# Patient Record
Sex: MALE | Race: WHITE | NOT HISPANIC OR LATINO | Employment: OTHER | URBAN - METROPOLITAN AREA
[De-identification: names, ages, dates, MRNs, and addresses within clinical notes are randomized per-mention and may not be internally consistent; named-entity substitution may affect disease eponyms.]

---

## 2023-12-08 ENCOUNTER — APPOINTMENT (OUTPATIENT)
Dept: RADIOLOGY | Facility: HOSPITAL | Age: 68
End: 2023-12-08
Payer: MEDICARE

## 2023-12-08 ENCOUNTER — HOSPITAL ENCOUNTER (EMERGENCY)
Facility: HOSPITAL | Age: 68
Discharge: HOME | End: 2023-12-08
Attending: EMERGENCY MEDICINE
Payer: MEDICARE

## 2023-12-08 VITALS
HEART RATE: 94 BPM | SYSTOLIC BLOOD PRESSURE: 119 MMHG | BODY MASS INDEX: 37.67 KG/M2 | RESPIRATION RATE: 20 BRPM | TEMPERATURE: 97.7 F | HEIGHT: 67 IN | OXYGEN SATURATION: 93 % | DIASTOLIC BLOOD PRESSURE: 73 MMHG | WEIGHT: 240 LBS

## 2023-12-08 DIAGNOSIS — H81.10 BENIGN PAROXYSMAL POSITIONAL VERTIGO, UNSPECIFIED LATERALITY: ICD-10-CM

## 2023-12-08 DIAGNOSIS — R42 VERTIGO: Primary | ICD-10-CM

## 2023-12-08 LAB
ALBUMIN SERPL BCP-MCNC: 4.6 G/DL (ref 3.4–5)
ALP SERPL-CCNC: 60 U/L (ref 33–136)
ALT SERPL W P-5'-P-CCNC: 30 U/L (ref 10–52)
ANION GAP SERPL CALC-SCNC: 15 MMOL/L (ref 10–20)
AST SERPL W P-5'-P-CCNC: 27 U/L (ref 9–39)
BASOPHILS # BLD AUTO: 0.04 X10*3/UL (ref 0–0.1)
BASOPHILS NFR BLD AUTO: 0.4 %
BILIRUB SERPL-MCNC: 0.4 MG/DL (ref 0–1.2)
BUN SERPL-MCNC: 17 MG/DL (ref 6–23)
CALCIUM SERPL-MCNC: 9.5 MG/DL (ref 8.6–10.3)
CARDIAC TROPONIN I PNL SERPL HS: 3 NG/L (ref 0–20)
CARDIAC TROPONIN I PNL SERPL HS: <3 NG/L (ref 0–20)
CARDIAC TROPONIN I PNL SERPL HS: <3 NG/L (ref 0–20)
CHLORIDE SERPL-SCNC: 101 MMOL/L (ref 98–107)
CO2 SERPL-SCNC: 23 MMOL/L (ref 21–32)
CREAT SERPL-MCNC: 0.87 MG/DL (ref 0.5–1.3)
EOSINOPHIL # BLD AUTO: 0.28 X10*3/UL (ref 0–0.7)
EOSINOPHIL NFR BLD AUTO: 2.5 %
ERYTHROCYTE [DISTWIDTH] IN BLOOD BY AUTOMATED COUNT: 12.3 % (ref 11.5–14.5)
GFR SERPL CREATININE-BSD FRML MDRD: >90 ML/MIN/1.73M*2
GLUCOSE SERPL-MCNC: 180 MG/DL (ref 74–99)
HCT VFR BLD AUTO: 40 % (ref 41–52)
HGB BLD-MCNC: 13.3 G/DL (ref 13.5–17.5)
IMM GRANULOCYTES # BLD AUTO: 0.05 X10*3/UL (ref 0–0.7)
IMM GRANULOCYTES NFR BLD AUTO: 0.4 % (ref 0–0.9)
LYMPHOCYTES # BLD AUTO: 1.71 X10*3/UL (ref 1.2–4.8)
LYMPHOCYTES NFR BLD AUTO: 15.2 %
MCH RBC QN AUTO: 30.2 PG (ref 26–34)
MCHC RBC AUTO-ENTMCNC: 33.3 G/DL (ref 32–36)
MCV RBC AUTO: 91 FL (ref 80–100)
MONOCYTES # BLD AUTO: 0.68 X10*3/UL (ref 0.1–1)
MONOCYTES NFR BLD AUTO: 6 %
NEUTROPHILS # BLD AUTO: 8.48 X10*3/UL (ref 1.2–7.7)
NEUTROPHILS NFR BLD AUTO: 75.5 %
NRBC BLD-RTO: 0 /100 WBCS (ref 0–0)
PLATELET # BLD AUTO: 252 X10*3/UL (ref 150–450)
POTASSIUM SERPL-SCNC: 3.5 MMOL/L (ref 3.5–5.3)
PROT SERPL-MCNC: 7.6 G/DL (ref 6.4–8.2)
RBC # BLD AUTO: 4.4 X10*6/UL (ref 4.5–5.9)
SODIUM SERPL-SCNC: 135 MMOL/L (ref 136–145)
WBC # BLD AUTO: 11.2 X10*3/UL (ref 4.4–11.3)

## 2023-12-08 PROCEDURE — 80053 COMPREHEN METABOLIC PANEL: CPT | Performed by: STUDENT IN AN ORGANIZED HEALTH CARE EDUCATION/TRAINING PROGRAM

## 2023-12-08 PROCEDURE — 84484 ASSAY OF TROPONIN QUANT: CPT | Performed by: STUDENT IN AN ORGANIZED HEALTH CARE EDUCATION/TRAINING PROGRAM

## 2023-12-08 PROCEDURE — 84484 ASSAY OF TROPONIN QUANT: CPT | Performed by: EMERGENCY MEDICINE

## 2023-12-08 PROCEDURE — 99285 EMERGENCY DEPT VISIT HI MDM: CPT | Mod: 25

## 2023-12-08 PROCEDURE — 36415 COLL VENOUS BLD VENIPUNCTURE: CPT

## 2023-12-08 PROCEDURE — 99284 EMERGENCY DEPT VISIT MOD MDM: CPT | Performed by: EMERGENCY MEDICINE

## 2023-12-08 PROCEDURE — 36415 COLL VENOUS BLD VENIPUNCTURE: CPT | Performed by: STUDENT IN AN ORGANIZED HEALTH CARE EDUCATION/TRAINING PROGRAM

## 2023-12-08 PROCEDURE — 85025 COMPLETE CBC W/AUTO DIFF WBC: CPT | Performed by: EMERGENCY MEDICINE

## 2023-12-08 PROCEDURE — 71045 X-RAY EXAM CHEST 1 VIEW: CPT | Mod: FOREIGN READ | Performed by: RADIOLOGY

## 2023-12-08 PROCEDURE — 80053 COMPREHEN METABOLIC PANEL: CPT | Performed by: EMERGENCY MEDICINE

## 2023-12-08 PROCEDURE — 85025 COMPLETE CBC W/AUTO DIFF WBC: CPT | Performed by: STUDENT IN AN ORGANIZED HEALTH CARE EDUCATION/TRAINING PROGRAM

## 2023-12-08 PROCEDURE — 84484 ASSAY OF TROPONIN QUANT: CPT

## 2023-12-08 PROCEDURE — 71045 X-RAY EXAM CHEST 1 VIEW: CPT | Mod: FR

## 2023-12-08 PROCEDURE — 70450 CT HEAD/BRAIN W/O DYE: CPT | Performed by: STUDENT IN AN ORGANIZED HEALTH CARE EDUCATION/TRAINING PROGRAM

## 2023-12-08 PROCEDURE — 70450 CT HEAD/BRAIN W/O DYE: CPT

## 2023-12-08 RX ORDER — TADALAFIL 5 MG/1
5 TABLET ORAL
COMMUNITY
Start: 2019-11-04

## 2023-12-08 RX ORDER — FLUTICASONE PROPIONATE 50 MCG
SPRAY, SUSPENSION (ML) NASAL
COMMUNITY
Start: 2019-11-04

## 2023-12-08 RX ORDER — VIT C/E/ZN/COPPR/LUTEIN/ZEAXAN 250MG-90MG
1000 CAPSULE ORAL
COMMUNITY
Start: 2023-02-07

## 2023-12-08 RX ORDER — LISINOPRIL AND HYDROCHLOROTHIAZIDE 20; 25 MG/1; MG/1
TABLET ORAL
COMMUNITY
Start: 2023-02-08

## 2023-12-08 RX ORDER — LIDOCAINE HYDROCHLORIDE 20 MG/ML
5 SOLUTION OROPHARYNGEAL
COMMUNITY
Start: 2023-11-26

## 2023-12-08 RX ORDER — GLUCOSAMINE/CHONDR SU A SOD 167-133 MG
2 CAPSULE ORAL NIGHTLY
COMMUNITY
Start: 2011-06-09

## 2023-12-08 RX ORDER — METFORMIN HYDROCHLORIDE 500 MG/1
4 TABLET, EXTENDED RELEASE ORAL
COMMUNITY
Start: 2023-03-22

## 2023-12-08 RX ORDER — ROSUVASTATIN CALCIUM 40 MG/1
TABLET, COATED ORAL
COMMUNITY
Start: 2023-11-03

## 2023-12-08 RX ORDER — ASPIRIN 81 MG/1
TABLET ORAL
COMMUNITY
Start: 2023-02-07

## 2023-12-08 RX ORDER — IPRATROPIUM BROMIDE 42 UG/1
SPRAY, METERED NASAL
COMMUNITY
Start: 2022-03-03

## 2023-12-08 RX ORDER — ALBUTEROL SULFATE 90 UG/1
2 AEROSOL, METERED RESPIRATORY (INHALATION) EVERY 4 HOURS PRN
COMMUNITY
Start: 2023-08-08

## 2023-12-08 RX ORDER — MECLIZINE HYDROCHLORIDE 25 MG/1
25 TABLET ORAL 3 TIMES DAILY PRN
Qty: 10 TABLET | Refills: 0 | Status: SHIPPED | OUTPATIENT
Start: 2023-12-08 | End: 2023-12-18

## 2023-12-08 RX ORDER — TOPIRAMATE 50 MG/1
TABLET, FILM COATED ORAL
COMMUNITY
Start: 2023-08-08

## 2023-12-08 ASSESSMENT — LIFESTYLE VARIABLES
HAVE YOU EVER FELT YOU SHOULD CUT DOWN ON YOUR DRINKING: NO
EVER HAD A DRINK FIRST THING IN THE MORNING TO STEADY YOUR NERVES TO GET RID OF A HANGOVER: NO
EVER FELT BAD OR GUILTY ABOUT YOUR DRINKING: NO
HAVE PEOPLE ANNOYED YOU BY CRITICIZING YOUR DRINKING: NO
REASON UNABLE TO ASSESS: NO

## 2023-12-08 ASSESSMENT — COLUMBIA-SUICIDE SEVERITY RATING SCALE - C-SSRS
2. HAVE YOU ACTUALLY HAD ANY THOUGHTS OF KILLING YOURSELF?: NO
1. IN THE PAST MONTH, HAVE YOU WISHED YOU WERE DEAD OR WISHED YOU COULD GO TO SLEEP AND NOT WAKE UP?: NO
6. HAVE YOU EVER DONE ANYTHING, STARTED TO DO ANYTHING, OR PREPARED TO DO ANYTHING TO END YOUR LIFE?: NO

## 2023-12-08 ASSESSMENT — PAIN DESCRIPTION - PAIN TYPE: TYPE: ACUTE PAIN

## 2023-12-08 ASSESSMENT — PAIN - FUNCTIONAL ASSESSMENT: PAIN_FUNCTIONAL_ASSESSMENT: 0-10

## 2023-12-08 ASSESSMENT — PAIN SCALES - GENERAL: PAINLEVEL_OUTOF10: 3

## 2023-12-08 ASSESSMENT — PAIN DESCRIPTION - LOCATION: LOCATION: EAR

## 2023-12-08 NOTE — ED TRIAGE NOTES
Pt reports that he was on amoxicillin for dental infection. Had tooth pulled Wednesday 12/6/23.  Pt reports nausea/ earache since extraction

## 2023-12-08 NOTE — Clinical Note
Troy Sr was seen and treated in our emergency department on 12/8/2023.  He may return to school on 12/13/2023.      If you have any questions or concerns, please don't hesitate to call.      Marlon Michelle MD

## 2023-12-09 NOTE — ED PROVIDER NOTES
EMERGENCY DEPARTMENT ENCOUNTER      Pt Name: Troy Sr  MRN: [id number]  Birthdate 1955  Date of evaluation: 12/8/2023  Provider: Marlon Michelle MD    CHIEF COMPLAINT       Chief Complaint   Patient presents with    Nausea         HISTORY OF PRESENT ILLNESS    HPI  68-year-old male presents emergency room with chief complaint of nausea and vertigo.  Patient endorses pain 2 weeks ago on the left side of his face.  He went to the doctor and they realize that he had a tooth abscess.  They placed him on amoxicillin for 10 days which he took all of the prescribed medication he had a tooth pulled 2 days ago same time developed a head cold with mucus he took Sudafed last night little this morning and took a 12-hour Mucinex around 3:00 this afternoon he began to feel unbalanced nauseous and was concerned so he came to the emergency room.  Nursing Notes were reviewed.    PAST MEDICAL HISTORY     Past Medical History:   Diagnosis Date    Diabetes mellitus (CMS/[facility])     Hypertension          SURGICAL HISTORY       Past Surgical History:   Procedure Laterality Date    DENTAL SURGERY Left 12/06/2023    left lower         CURRENT MEDICATIONS       Previous Medications    ALBUTEROL 90 MCG/ACTUATION INHALER    Inhale 2 puffs every 4 hours if needed.    ASPIRIN 81 MG EC TABLET    Take by mouth.    CHOLECALCIFEROL (VITAMIN D-3) 25 MCG (1000 UT) CAPSULE    Take 1 capsule (25 mcg) by mouth once daily.    FLUTICASONE (FLONASE ALLERGY RELIEF) 50 MCG/ACTUATION NASAL SPRAY    Administer into affected nostril(s).    IPRATROPIUM (ATROVENT) 42 MCG (0.06 %) NASAL SPRAY    ADMINISTER 2 (TWO) PUFFS THREE TIMES DAILY IN EACH NOSTRIL AS NEEDED    LIDOCAINE (XYLOCAINE) 2 % SOLUTION    Take 5 mL by mouth.    LISINOPRIL-HYDROCHLOROTHIAZIDE 20-25 MG TABLET        METFORMIN  MG 24 HR TABLET    Take 4 tablets (2,000 mg) by mouth once daily with a meal.    NIACIN 500 MG TABLET    Take 2 tablets (1,000 mg) by mouth once daily at bedtime.     ROSUVASTATIN (CRESTOR) 40 MG TABLET    Take by mouth.    TADALAFIL (CIALIS) 5 MG TABLET    Take 1 tablet (5 mg) by mouth.    TOPIRAMATE (TOPAMAX) 50 MG TABLET    Take by mouth.       ALLERGIES     Patient has no known allergies.    FAMILY HISTORY     No family history on file.       SOCIAL HISTORY       Social History     Socioeconomic History    Marital status: Unknown     Spouse name: None    Number of children: None    Years of education: None    Highest education level: None   Occupational History    None   Tobacco Use    Smoking status: Never    Smokeless tobacco: Never   Substance and Sexual Activity    Alcohol use: Yes     Comment: social    Drug use: Never    Sexual activity: None   Other Topics Concern    None   Social History Narrative    None     Social Determinants of Health     Financial Resource Strain: Not on file   Food Insecurity: Not on file   Transportation Needs: Not on file   Physical Activity: Not on file   Stress: Not on file   Social Connections: Not on file   Intimate Partner Violence: Not on file   Housing Stability: Not on file       SCREENINGS                        PHYSICAL EXAM    (up to 7 for level 4, 8 or more for level 5)     ED Triage Vitals [12/08/23 1732]   Temp Pulse Resp BP   36.5 °C (97.7 °F) -- 16 158/88      SpO2 Temp Source Heart Rate Source Patient Position   95 % Temporal Monitor Sitting      BP Location FiO2 (%)     Right arm --       Physical Exam  Vitals and nursing note reviewed.   Constitutional:       General: He is not in acute distress.     Appearance: He is well-developed.   HENT:      Head: Normocephalic and atraumatic.   Eyes:      Conjunctiva/sclera: Conjunctivae normal.   Cardiovascular:      Rate and Rhythm: Normal rate and regular rhythm.      Heart sounds: No murmur heard.  Pulmonary:      Effort: Pulmonary effort is normal. No respiratory distress.      Breath sounds: Normal breath sounds.   Abdominal:      Palpations: Abdomen is soft.      Tenderness:  There is no abdominal tenderness.   Musculoskeletal:         General: No swelling.      Cervical back: Neck supple.   Skin:     General: Skin is warm and dry.      Capillary Refill: Capillary refill takes less than 2 seconds.   Neurological:      Mental Status: He is alert and oriented to person, place, and time. Mental status is at baseline.      Cranial Nerves: Cranial nerves 2-12 are intact.      Sensory: Sensation is intact.      Motor: Motor function is intact.      Coordination: Coordination is intact.      Gait: Gait is intact.      Comments: Patient is neurologically intact NIH of 0.  Patient is able to ambulate and has no neurological deficits throughout.  Initially he complained of vertigo this afternoon however I was unable to elicit vertigo on testing with Amelia-Hallpike maneuver in the emergency room.   Psychiatric:         Mood and Affect: Mood normal.          DIAGNOSTIC RESULTS     LABS:  Labs Reviewed   CBC WITH AUTO DIFFERENTIAL - Abnormal       Result Value    WBC 11.2      nRBC 0.0      RBC 4.40 (*)     Hemoglobin 13.3 (*)     Hematocrit 40.0 (*)     MCV 91      MCH 30.2      MCHC 33.3      RDW 12.3      Platelets 252      Neutrophils % 75.5      Immature Granulocytes %, Automated 0.4      Lymphocytes % 15.2      Monocytes % 6.0      Eosinophils % 2.5      Basophils % 0.4      Neutrophils Absolute 8.48 (*)     Immature Granulocytes Absolute, Automated 0.05      Lymphocytes Absolute 1.71      Monocytes Absolute 0.68      Eosinophils Absolute 0.28      Basophils Absolute 0.04     COMPREHENSIVE METABOLIC PANEL - Abnormal    Glucose 180 (*)     Sodium 135 (*)     Potassium 3.5      Chloride 101      Bicarbonate 23      Anion Gap 15      Urea Nitrogen 17      Creatinine 0.87      eGFR >90      Calcium 9.5      Albumin 4.6      Alkaline Phosphatase 60      Total Protein 7.6      AST 27      Bilirubin, Total 0.4      ALT 30     SERIAL TROPONIN-INITIAL - Normal    Troponin I, High Sensitivity 3       Narrative:     Less than 99th percentile of normal range cutoff-  Female and children under 18 years old <14 ng/L; Male <21 ng/L: Negative  Repeat testing should be performed if clinically indicated.     Female and children under 18 years old 14-50 ng/L; Male 21-50 ng/L:  Consistent with possible cardiac damage and possible increased clinical   risk. Serial measurements may help to assess extent of myocardial damage.     >50 ng/L: Consistent with cardiac damage, increased clinical risk and  myocardial infarction. Serial measurements may help assess extent of   myocardial damage.      NOTE: Children less than 1 year old may have higher baseline troponin   levels and results should be interpreted in conjunction with the overall   clinical context.     NOTE: Troponin I testing is performed using a different   testing methodology at [facility] than at other   [facility]. Direct result comparisons should only   be made within the same method.   TROPONIN SERIES- (INITIAL, 1 HR)    Narrative:     The following orders were created for panel order Troponin Series, (0, 1 HR).  Procedure                               Abnormality         Status                     ---------                               -----------         ------                     Troponin I, High Sensiti...[[id number]]  Normal              Final result               Troponin, High Sensitivi...[[id number]]                                                   Please view results for these tests on the individual orders.   SERIAL TROPONIN, 1 HOUR       All other labs were within normal range or not returned as of this dictation.    Imaging  CT head wo IV contrast   Final Result   1. No acute intracranial abnormality.                       MACRO:   None.        Signed by: Babar Arellano 12/8/2023 6:31 PM   Dictation workstation:   [id number]      XR chest 1 view    (Results Pending)        Procedures  Procedures     EMERGENCY DEPARTMENT  COURSE/MDM:     Diagnoses as of 12/08/23 2104   Vertigo   Benign paroxysmal positional vertigo, unspecified laterality      68-year-old male presents emergency room with chief complaint of nausea and vertigo.  Medical management treatment emergency room will consist of CBC, CMP 2 troponins CT head without IV contrast no chest x-ray.  Blood work revealed no acute findings.  CT the scan of the head showed no acute findings.  Physical exam also showed no acute findings no neurodeficits.  Patient will most likely be discharged home with follow-up to neurology with a prescription of meclizine.  Follow-up appointment with neurology was included in the patient's discharge paperwork.  Patient was instructed to follow-up with them and told that he has a prescription of meclizine sent to his local pharmacy.  Patient was feeling better and will be discharged.  Medical Decision Making      Patient and or family in agreement and understanding of treatment plan.  All questions answered.      I reviewed the case with the attending ED physician. The attending ED physician agrees with the plan. Patient and/or patient´s representative was counseled regarding labs, imaging, likely diagnosis, and plan. All questions were answered.    ED Medications administered this visit:  Medications - No data to display    New Prescriptions from this visit:    New Prescriptions    No medications on file       Follow-up:  No follow-up provider specified.      Final Impression: No diagnosis found.      (Please note that portions of this note were completed with a voice recognition program.  Efforts were made to edit the dictations but occasionally words are mis-transcribed.)     Marlon Michelle MD  Resident  12/08/23 1909       Marlon Michelle MD  Resident  12/08/23 2105

## 2023-12-09 NOTE — DISCHARGE INSTRUCTIONS
You are seen emergency room today for vertigo.  Blood work and imaging showed no acute findings.  If you develop any new focal neurological deficits numbness tingling or unable to walk nausea vomiting or any other concerning symptoms please come back to emergency room soon as possible.  We have sent prescription of Antivert AKA meclizine to your local pharmacy.  Please take them as needed.  We have also included instructions on maneuvers for vertigo.

## 2023-12-11 ENCOUNTER — HOSPITAL ENCOUNTER (OUTPATIENT)
Dept: CARDIOLOGY | Facility: HOSPITAL | Age: 68
Discharge: HOME | End: 2023-12-11
Payer: MEDICARE

## 2023-12-11 PROCEDURE — 93005 ELECTROCARDIOGRAM TRACING: CPT

## 2023-12-13 LAB
ATRIAL RATE: 90 BPM
P AXIS: 52 DEGREES
P OFFSET: 183 MS
P ONSET: 135 MS
PR INTERVAL: 162 MS
Q ONSET: 216 MS
QRS COUNT: 15 BEATS
QRS DURATION: 104 MS
QT INTERVAL: 372 MS
QTC CALCULATION(BAZETT): 455 MS
QTC FREDERICIA: 425 MS
R AXIS: 75 DEGREES
T AXIS: 46 DEGREES
T OFFSET: 402 MS
VENTRICULAR RATE: 90 BPM